# Patient Record
Sex: FEMALE | Race: WHITE | NOT HISPANIC OR LATINO | Employment: UNEMPLOYED | URBAN - METROPOLITAN AREA
[De-identification: names, ages, dates, MRNs, and addresses within clinical notes are randomized per-mention and may not be internally consistent; named-entity substitution may affect disease eponyms.]

---

## 2021-02-24 ENCOUNTER — OFFICE VISIT (OUTPATIENT)
Dept: OBSTETRICS AND GYNECOLOGY | Facility: CLINIC | Age: 61
End: 2021-02-24

## 2021-02-24 VITALS
DIASTOLIC BLOOD PRESSURE: 80 MMHG | SYSTOLIC BLOOD PRESSURE: 128 MMHG | WEIGHT: 171 LBS | BODY MASS INDEX: 27.48 KG/M2 | HEIGHT: 66 IN

## 2021-02-24 DIAGNOSIS — N94.10 FEMALE DYSPAREUNIA: Primary | ICD-10-CM

## 2021-02-24 PROCEDURE — 99213 OFFICE O/P EST LOW 20 MIN: CPT | Performed by: NURSE PRACTITIONER

## 2021-02-24 RX ORDER — HYDROXYCHLOROQUINE SULFATE 200 MG/1
TABLET, FILM COATED ORAL DAILY
COMMUNITY

## 2021-02-24 RX ORDER — MELOXICAM 7.5 MG/1
7.5 TABLET ORAL DAILY
COMMUNITY

## 2021-02-24 RX ORDER — ZOLPIDEM TARTRATE 10 MG/1
10 TABLET ORAL NIGHTLY PRN
COMMUNITY

## 2021-02-24 RX ORDER — ESTRADIOL 0.1 MG/G
CREAM VAGINAL
Qty: 1 EACH | Refills: 5 | Status: SHIPPED | OUTPATIENT
Start: 2021-02-24

## 2021-02-24 RX ORDER — LEVOTHYROXINE SODIUM 0.1 MG/1
TABLET ORAL
COMMUNITY
Start: 2020-11-22

## 2021-02-24 RX ORDER — VENLAFAXINE HYDROCHLORIDE 150 MG/1
150 CAPSULE, EXTENDED RELEASE ORAL DAILY
COMMUNITY

## 2021-02-24 NOTE — PROGRESS NOTES
"Chief Complaint   Patient presents with   • Follow-up     painful intercourse         Subjective   HPI  Violette Boss is a 60 y.o. female, No obstetric history on file., who presents with evaluation of vaginal dryness and  tenderness that is initial.  Patient states she is starting to have painful intercourse. Patient reports feeling a lot of tenderness and pressure.    She states she has experienced this problem for several month.  She describes the severity as moderate. The patient has previously been evaluated for vaginitis.  She had been on ERT in the past but was taken off of it due to past h/o stroke.     Her last LMP was No LMP recorded (lmp unknown). Patient has had a hysterectomy. Partner Status: Marital Status: .  New Partners since last visit: no.  Desires STD Screening: no.    Additional OB/GYN History   Last Pap :   Last Completed Pap Smear     Patient has no health maintenance due at this time        History of abnormal Pap smear: no  OB History    No obstetric history on file.         The additional following portions of the patient's history were reviewed and updated as appropriate: allergies, current medications, past family history, past medical history, past social history, past surgical history and problem list.    Review of Systems   Constitutional: Negative.    HENT: Negative.    Eyes: Negative.    Respiratory: Negative.    Cardiovascular: Negative.    Gastrointestinal: Negative.    Endocrine: Negative.    Genitourinary: Positive for dyspareunia and vaginal pain.   Musculoskeletal: Negative.    Skin: Negative.    Allergic/Immunologic: Negative.    Neurological: Negative.    Hematological: Negative.    Psychiatric/Behavioral: Negative.      All other systems reviewed and are negative.     I have reviewed and agree with the HPI, ROS, and historical information as entered above. Sarah Pina, APRN    Objective   /80   Ht 167.6 cm (66\")   Wt 77.6 kg (171 lb)   LMP  (LMP Unknown)  "  BMI 27.60 kg/m²     Physical Exam  Vitals signs and nursing note reviewed. Exam conducted with a chaperone present.   Constitutional:       Appearance: Normal appearance. She is normal weight.   Genitourinary:     General: Normal vulva.      Vagina: Normal.      Uterus: Absent.       Rectum: Normal.   Neurological:      Mental Status: She is alert.           Assessment/Plan     Assessment and Plan    Problem List Items Addressed This Visit     None      dyspareunia    1. Will use Estrace cream twice weekly for dyspareunia. (h/o stroke in 2008 and had been on ERT for several years following this but was taken off of ERT last year due to age and potential risks). Okay to use Estrace cream twice weekly for dyspareunia. Advised to do kegel exercises for prolapsed bladder and avoid heavy lifting. She denies urinary incontinence.         Sarah Pina, APRN  02/24/2021

## 2021-09-05 PROCEDURE — U0004 COV-19 TEST NON-CDC HGH THRU: HCPCS | Performed by: NURSE PRACTITIONER

## 2021-09-07 ENCOUNTER — TELEPHONE (OUTPATIENT)
Dept: URGENT CARE | Facility: CLINIC | Age: 61
End: 2021-09-07

## 2024-05-21 PROBLEM — M35.9 UNDIFFERENTIATED CONNECTIVE TISSUE DISEASE: Status: ACTIVE | Noted: 2024-05-21

## 2024-05-22 ENCOUNTER — OFFICE VISIT (OUTPATIENT)
Age: 64
End: 2024-05-22
Payer: COMMERCIAL

## 2024-05-22 ENCOUNTER — LAB (OUTPATIENT)
Facility: HOSPITAL | Age: 64
End: 2024-05-22
Payer: COMMERCIAL

## 2024-05-22 VITALS
TEMPERATURE: 96.9 F | WEIGHT: 158.8 LBS | HEART RATE: 88 BPM | SYSTOLIC BLOOD PRESSURE: 110 MMHG | HEIGHT: 68 IN | DIASTOLIC BLOOD PRESSURE: 78 MMHG | BODY MASS INDEX: 24.07 KG/M2

## 2024-05-22 DIAGNOSIS — M23.8X1 CREPITUS OF BOTH KNEE JOINTS: ICD-10-CM

## 2024-05-22 DIAGNOSIS — Z79.899 HIGH RISK MEDICATION USE: ICD-10-CM

## 2024-05-22 DIAGNOSIS — M23.8X2 CREPITUS OF BOTH KNEE JOINTS: ICD-10-CM

## 2024-05-22 DIAGNOSIS — M25.562 LEFT KNEE PAIN, UNSPECIFIED CHRONICITY: ICD-10-CM

## 2024-05-22 DIAGNOSIS — M35.9 UNDIFFERENTIATED CONNECTIVE TISSUE DISEASE: Primary | ICD-10-CM

## 2024-05-22 DIAGNOSIS — M35.9 UNDIFFERENTIATED CONNECTIVE TISSUE DISEASE: ICD-10-CM

## 2024-05-22 PROBLEM — R53.83 OTHER FATIGUE: Status: ACTIVE | Noted: 2024-05-22

## 2024-05-22 PROBLEM — R53.83 OTHER FATIGUE: Status: RESOLVED | Noted: 2024-05-22 | Resolved: 2024-05-22

## 2024-05-22 LAB
BASOPHILS # BLD AUTO: 0.04 10*3/MM3 (ref 0–0.2)
BASOPHILS NFR BLD AUTO: 0.7 % (ref 0–1.5)
DEPRECATED RDW RBC AUTO: 40.6 FL (ref 37–54)
EOSINOPHIL # BLD AUTO: 0.16 10*3/MM3 (ref 0–0.4)
EOSINOPHIL NFR BLD AUTO: 2.8 % (ref 0.3–6.2)
ERYTHROCYTE [DISTWIDTH] IN BLOOD BY AUTOMATED COUNT: 12 % (ref 12.3–15.4)
HCT VFR BLD AUTO: 42.7 % (ref 34–46.6)
HGB BLD-MCNC: 14 G/DL (ref 12–15.9)
IMM GRANULOCYTES # BLD AUTO: 0.01 10*3/MM3 (ref 0–0.05)
IMM GRANULOCYTES NFR BLD AUTO: 0.2 % (ref 0–0.5)
LYMPHOCYTES # BLD AUTO: 2.02 10*3/MM3 (ref 0.7–3.1)
LYMPHOCYTES NFR BLD AUTO: 35.8 % (ref 19.6–45.3)
MCH RBC QN AUTO: 30.5 PG (ref 26.6–33)
MCHC RBC AUTO-ENTMCNC: 32.8 G/DL (ref 31.5–35.7)
MCV RBC AUTO: 93 FL (ref 79–97)
MONOCYTES # BLD AUTO: 0.52 10*3/MM3 (ref 0.1–0.9)
MONOCYTES NFR BLD AUTO: 9.2 % (ref 5–12)
NEUTROPHILS NFR BLD AUTO: 2.9 10*3/MM3 (ref 1.7–7)
NEUTROPHILS NFR BLD AUTO: 51.3 % (ref 42.7–76)
NRBC BLD AUTO-RTO: 0 /100 WBC (ref 0–0.2)
PLATELET # BLD AUTO: 292 10*3/MM3 (ref 140–450)
PMV BLD AUTO: 10.2 FL (ref 6–12)
RBC # BLD AUTO: 4.59 10*6/MM3 (ref 3.77–5.28)
WBC NRBC COR # BLD AUTO: 5.65 10*3/MM3 (ref 3.4–10.8)

## 2024-05-22 PROCEDURE — 85652 RBC SED RATE AUTOMATED: CPT

## 2024-05-22 PROCEDURE — 36415 COLL VENOUS BLD VENIPUNCTURE: CPT

## 2024-05-22 PROCEDURE — 80053 COMPREHEN METABOLIC PANEL: CPT

## 2024-05-22 PROCEDURE — 86140 C-REACTIVE PROTEIN: CPT

## 2024-05-22 PROCEDURE — 85025 COMPLETE CBC W/AUTO DIFF WBC: CPT

## 2024-05-22 RX ORDER — MELOXICAM 7.5 MG/1
7.5 TABLET ORAL DAILY
Qty: 30 TABLET | Refills: 3 | Status: SHIPPED | OUTPATIENT
Start: 2024-05-22

## 2024-05-22 RX ORDER — HYDROXYCHLOROQUINE SULFATE 200 MG/1
200 TABLET, FILM COATED ORAL DAILY
Qty: 30 TABLET | Refills: 3 | Status: SHIPPED | OUTPATIENT
Start: 2024-05-22

## 2024-05-22 RX ORDER — ZOLPIDEM TARTRATE 10 MG/1
10 TABLET ORAL NIGHTLY PRN
Qty: 30 TABLET | Refills: 3 | Status: SHIPPED | OUTPATIENT
Start: 2024-05-22

## 2024-05-22 NOTE — PROGRESS NOTES
[facility] Follow Up Visit     Office Follow Up      Date: 05/22/2024   Patient Name: Violette Boss  MRN: [id number]  YOB: 1960    Referring Physician: Mickie Hayes MD     Chief Complaint:   Chief Complaint   Patient presents with    Arthritis       History of Present Illness: Violette Boss is a 63 y.o. female who is here today for follow up on Undifferentiated CTD.  Am stiffness 30 minutes.  Pt rates her pain 0/10 today.  She has some L knee pain that is intermittent. She has been to orthopedics and PT for this. Received Lt knee steroid injection , did not work. Went to PT. Denies receiving dry needling. Never had MRI on knee. Walking makes knee pain worse.  When she is more active a lot it helps it some. PT helped just a tiny bit. She went off Meloxicam a while to see if it worked, her knee pain worsened, she went back on it.   Continue Plaq once daily  Uses sunscreen.   Denies photosensitive rash, Oron[place]al ulcers, Pleurisy, , Synovitis   Positive Dry Mouth  Positive Raynaud's in feet. When Cold they turn red to blue.        Result Review :          Subjective   Review of Systems: Review of Systems   Constitutional:  Positive for fatigue. Negative for fever.        Night sweats   HENT:  Negative for mouth sores, nosebleeds, swollen glands and trouble swallowing.    Eyes:  Negative for blurred vision, double vision, photophobia, pain and visual disturbance.   Respiratory:  Negative for apnea, cough, choking and shortness of breath.    Cardiovascular:  Positive for palpitations. Negative for chest pain and leg swelling.        Raynaud's   Gastrointestinal:  Positive for GERD. Negative for abdominal pain, blood in stool, constipation, diarrhea, nausea and vomiting.   Endocrine: Negative for cold intolerance, heat intolerance, polydipsia, polyphagia and polyuria.        Hot flashes  Hair loss   Genitourinary:  Negative for difficulty urinating,  dysuria, genital sores, hematuria and urinary incontinence.   Musculoskeletal:  Negative for arthralgias, back pain, gait problem, joint swelling, myalgias, neck pain, neck stiffness and bursitis.        Joint pain  Joint tenderness  Morning stiffness   Skin:  Negative for rash.   Allergic/Immunologic: Negative for environmental allergies and food allergies.   Neurological:  Negative for dizziness, tremors, seizures, syncope, weakness, numbness, headache, memory problem and confusion.   Hematological:  Negative for adenopathy. Bruises/bleeds easily.   Psychiatric/Behavioral:  Negative for sleep disturbance, suicidal ideas, depressed mood and stress. The patient is not nervous/anxious.         Medications:   Current Outpatient Medications:     acetaminophen (TYLENOL) 500 MG tablet, Take 2 tablets by mouth Every 4 (Four) to 6 (Six) Hours As Needed. Do not exceed 8 tablets per 24 hrs as needed, Disp: , Rfl:     aspirin 81 MG EC tablet, Take 2 tablets by mouth Daily., Disp: , Rfl:     carbidopa-levodopa (SINEMET)  MG per tablet, 1.5 tablets Every Night., Disp: , Rfl:     hydroxychloroquine (PLAQUENIL) 200 MG tablet, Take  by mouth Daily., Disp: , Rfl:     levothyroxine (SYNTHROID, LEVOTHROID) 100 MCG tablet, , Disp: , Rfl:     meloxicam (MOBIC) 7.5 MG tablet, Take 1 tablet by mouth Daily., Disp: , Rfl:     omeprazole (priLOSEC) 20 MG capsule, Take 1 capsule by mouth Daily. 30 minutes to 1 hr before a meal every other day, Disp: , Rfl:     venlafaxine XR (EFFEXOR-XR) 150 MG 24 hr capsule, Take 1 capsule by mouth Daily., Disp: , Rfl:     zolpidem (AMBIEN) 10 MG tablet, Take 1 tablet by mouth At Night As Needed for Sleep., Disp: , Rfl:     estradiol (ESTRACE VAGINAL) 0.1 MG/GM vaginal cream, Insert 1 gram per vagina twice weekly (Patient not taking: Reported on 5/22/2024), Disp: 1 each, Rfl: 5    levothyroxine (SYNTHROID, LEVOTHROID) 25 MCG tablet, Take 1 tablet by mouth Daily. (Patient not taking: Reported on  "5/22/2024), Disp: , Rfl:     methylPREDNISolone (MEDROL) 4 MG dose pack, Take as directed on package instructions. (Patient not taking: Reported on 5/22/2024), Disp: 21 tablet, Rfl: 0    venlafaxine XR (EFFEXOR-XR) 75 MG 24 hr capsule, Take 1 capsule by mouth Daily. In the morning at the same time each day (Patient not taking: Reported on 5/22/2024), Disp: , Rfl:     Allergies:   Allergies   Allergen Reactions    Hydroxychloroquine Rash       I have reviewed and updated the patient's chief complaint, history of present illness, review of systems, past medical history, surgical history, family history, social history, medications and allergy list as appropriate.     Objective    Vital Signs:   Vitals:    05/22/24 1412   BP: 110/78   BP Location: Left arm   Patient Position: Sitting   Cuff Size: Adult   Pulse: 88   Temp: 96.9 °F (36.1 °C)   TempSrc: Temporal   Weight: 72 kg (158 lb 12.8 oz)   Height: 172.7 cm (68\")   PainSc: 0-No pain     Violetteeddie Boss reports a pain score of 0.  Given her pain assessment as noted, treatment options were discussed and the following options were decided upon as a follow-up plan to address the patient's pain: continuation of current treatment plan for pain.      Body mass index is 24.15 kg/m².  BMI cannot be calculated due to outdated height or weight values.  Please input a current height/weight in Vitals and re-renter BMIFOLLOWUP in Note to pull in correct documentation based on BMI range.      Physical Exam:  Physical Exam  Constitutional:       General: She is not in acute distress.     Appearance: She is not ill-appearing, toxic-appearing or diaphoretic.   HENT:      Right Ear: External ear normal.      Left Ear: External ear normal.      Nose: Nose normal. No congestion.      Mouth/Throat:      Pharynx: Oropharynx is clear. No oropharyngeal exudate or posterior oropharyngeal erythema.   Eyes:      Extraocular Movements: Extraocular movements intact.      Conjunctiva/sclera: " Conjunctivae normal.      Pupils: Pupils are equal, round, and reactive to light.   Cardiovascular:      Rate and Rhythm: Normal rate and regular rhythm.      Pulses: Normal pulses.      Heart sounds: Normal heart sounds.   Pulmonary:      Effort: Pulmonary effort is normal.      Breath sounds: Normal breath sounds.   Abdominal:      General: Abdomen is flat. Bowel sounds are normal.      Palpations: Abdomen is soft.   Musculoskeletal:         General: Normal range of motion.      Right knee: Crepitus present.      Left knee: Crepitus (L>R) present.   Skin:     General: Skin is warm and dry.      Capillary Refill: Capillary refill takes less than 2 seconds.   Neurological:      General: No focal deficit present.      Mental Status: She is oriented to person, place, and time.      Cranial Nerves: No cranial nerve deficit.      Motor: No weakness.      Deep Tendon Reflexes: Reflexes normal.   Psychiatric:         Mood and Affect: Mood normal.         Behavior: Behavior normal.         Thought Content: Thought content normal.          Physical Exam  There is currently no information documented on the homunculus. Go to the Rheumatology activity and complete the homunculus joint exam.     Results Review:   Imaging Results (Last 24 Hours)       ** No results found for the last 24 hours. **            Procedures    Assessment / Plan    Assessment/Plan:   Diagnoses and all orders for this visit:    1. Undifferentiated connective tissue disease (Primary)  Assessment & Plan:  + RACHELLE, photosensitive rash, Raynaud's.  Mostly has fatigue and intermittent joint pains.    Previous Rx: Plaquenil (caused a rash), sulfasalazine (upset stomach)  Current Rx: Plaquenil 200 mg daily    Plan:  Continue daily exercise.  Continue Plaquenil 200mg daily.   SPF 50+  Biotene or ACT toothpaste or mouth rinse  Xylimelts at bedtime - [facility], [facility]  [place] spray from [facility] for mouth dryness (amazon)  Plenty of fluids  If no Asthma or COPD there  are two prescriptions avaibile.       2. Left knee pain, unspecified chronicity  Assessment & Plan:  She has some L knee pain that is intermittent. She has been to orthopedics and PT for this. Received Lt knee steroid injection , did not work. Went to PT. Denies receiving dry needling. Never had MRI on knee. Walking makes knee pain worse.  When she is more active a lot it helps it some. PT helped just a tiny bit. She went off Meloxicam a while to see if it worked, her knee pain worsened, she went back on it.   Does want to give way periodically. 8/10 pain when walking at times.   Plan:  Continue meloxicam 7.5mg daily.    Tylenol Arthritis 2 tablets twice dailys as needed - [facility] generic or BRAND  Continue baby aspirin.   XR L knee today.  ? 2nd opinion Ortho.  Continue Plaq once daily         3. Crepitus of both knee joints  Assessment & Plan:  Bilateral knee films today      4. High risk medication use  Assessment & Plan:  Plaquenil well tolerated and effective.     Plan:    CBC, CMP q 6 mos.  Annual OCT eye exam. 11/23                Follow Up:   Return in about 6 months (around 11/22/2024) for Next scheduled follow up.        Scribed for Lyndsey Conklin MD by Magdalena Helms MA 5/22/2024  15:19 EDT      Lyndsey Conklin MD  [facility]

## 2024-05-22 NOTE — ASSESSMENT & PLAN NOTE
+ RACHELLE, photosensitive rash, Raynaud's.  Mostly has fatigue and intermittent joint pains.    Previous Rx: Plaquenil (caused a rash), sulfasalazine (upset stomach)  Current Rx: Plaquenil 200 mg daily    Plan:  Continue daily exercise.  Continue Plaquenil 200mg daily.   SPF 50+  Biotene or ACT toothpaste or mouth rinse  Xylimelts at bedtime - [facility], [facility]  [place] spray from [facility] for mouth dryness (amazon)  Plenty of fluids  If no Asthma or COPD there are two prescriptions avaibile.

## 2024-05-22 NOTE — ASSESSMENT & PLAN NOTE
She has some L knee pain that is intermittent. She has been to orthopedics and PT for this. Received Lt knee steroid injection , did not work. Went to PT. Denies receiving dry needling. Never had MRI on knee. Walking makes knee pain worse.  When she is more active a lot it helps it some. PT helped just a tiny bit. She went off Meloxicam a while to see if it worked, her knee pain worsened, she went back on it.   Does want to give way periodically. 8/10 pain when walking at times.   Plan:  Continue meloxicam 7.5mg daily.    Tylenol Arthritis 2 tablets twice dailys as needed - [facility] generic or BRAND  Continue baby aspirin.   XR L knee today.  ? 2nd opinion Ortho.  Continue Plaq once daily

## 2024-05-22 NOTE — ASSESSMENT & PLAN NOTE
Plaquenil well tolerated and effective.     Plan:    CBC, CMP q 6 mos.  Annual OCT eye exam. 11/23

## 2024-05-23 LAB
ALBUMIN SERPL-MCNC: 4.5 G/DL (ref 3.5–5.2)
ALBUMIN/GLOB SERPL: 1.7 G/DL
ALP SERPL-CCNC: 77 U/L (ref 39–117)
ALT SERPL W P-5'-P-CCNC: 31 U/L (ref 1–33)
ANION GAP SERPL CALCULATED.3IONS-SCNC: 7.4 MMOL/L (ref 5–15)
AST SERPL-CCNC: 22 U/L (ref 1–32)
BILIRUB SERPL-MCNC: 0.4 MG/DL (ref 0–1.2)
BUN SERPL-MCNC: 15 MG/DL (ref 8–23)
BUN/CREAT SERPL: 16.1 (ref 7–25)
CALCIUM SPEC-SCNC: 9.8 MG/DL (ref 8.6–10.5)
CHLORIDE SERPL-SCNC: 100 MMOL/L (ref 98–107)
CO2 SERPL-SCNC: 30.6 MMOL/L (ref 22–29)
CREAT SERPL-MCNC: 0.93 MG/DL (ref 0.57–1)
CRP SERPL-MCNC: <0.3 MG/DL (ref 0–0.5)
EGFRCR SERPLBLD CKD-EPI 2021: 70.1 ML/MIN/1.73
ERYTHROCYTE [SEDIMENTATION RATE] IN BLOOD: 8 MM/HR (ref 0–30)
GLOBULIN UR ELPH-MCNC: 2.6 GM/DL
GLUCOSE SERPL-MCNC: 88 MG/DL (ref 65–99)
POTASSIUM SERPL-SCNC: 4.5 MMOL/L (ref 3.5–5.2)
PROT SERPL-MCNC: 7.1 G/DL (ref 6–8.5)
SODIUM SERPL-SCNC: 138 MMOL/L (ref 136–145)

## 2024-07-05 ENCOUNTER — TELEPHONE (OUTPATIENT)
Age: 64
End: 2024-07-05

## 2024-07-07 NOTE — TELEPHONE ENCOUNTER
To my knowledge there should not be a problem with those 2 meds however if she is not sure about taking them together she can ask her pharmacist as well.

## 2024-11-21 ENCOUNTER — TELEPHONE (OUTPATIENT)
Age: 64
End: 2024-11-21
Payer: COMMERCIAL

## 2024-11-21 NOTE — TELEPHONE ENCOUNTER
Patient had an appointment scheduled with Dr. Lyndsey Conklin that was on 11/25/24 and then she said another got scheduled for 12/06/24. She wanted to know which appointment she was supposed to keep.     I let her know you would give her a call back to discuss her options for appointments as Dr. Conklin is not currently in office.

## 2024-11-21 NOTE — TELEPHONE ENCOUNTER
PT HAD TO BE CANCELLED DUE TO A PROVIDER EMERGENCY. I HAVE REACHED OUT VIA ARTERA, CUPRHART AND PHONE. IF PT CALLS BACK TO RESCHEDULE, PLEASE SCHEDULE WITH FATUMA JOHNSON OR LATOYA. IF THE PT WANTS TO SEE DR. CASTANEDA, SHE IS BOOKED UNTIL MARCH. IF IT IS A NEW PT, CONFRIM IF THEY ARE INTRESTED IN SEEING ANOTHER PROVIDER OR IF THEY WANT TO WAIT FOR DR. CASTANEDA. PT HAS BEEN ADDED TO THE CANCELLATION LIST AS HIGH PRIORITY.         -HUB READY TO SCHEDULE.

## 2024-11-24 DIAGNOSIS — M35.9 UNDIFFERENTIATED CONNECTIVE TISSUE DISEASE: ICD-10-CM

## 2024-11-26 RX ORDER — ZOLPIDEM TARTRATE 10 MG/1
10 TABLET ORAL NIGHTLY PRN
Qty: 30 TABLET | Refills: 5 | Status: SHIPPED | OUTPATIENT
Start: 2024-11-26

## 2024-12-12 DIAGNOSIS — M35.9 UNDIFFERENTIATED CONNECTIVE TISSUE DISEASE: ICD-10-CM

## 2024-12-12 RX ORDER — CARBIDOPA AND LEVODOPA 25; 100 MG/1; MG/1
TABLET ORAL
Qty: 60 TABLET | Refills: 3 | Status: SHIPPED | OUTPATIENT
Start: 2024-12-12

## 2025-02-28 ENCOUNTER — TELEPHONE (OUTPATIENT)
Age: 65
End: 2025-02-28
Payer: COMMERCIAL

## 2025-02-28 NOTE — TELEPHONE ENCOUNTER
PT APPT HAD TO BE CANCELLED. IF PT CALLS BACK TO RESCHEDULE, PLEASE SCHEDULE WITH FTAUMA JOHNSON OR LATOYA. IF NOT ALREADY ON, PLEASE ADD PT TO THE CANCELLATION LIST AS NORMAL PRIOTIRY WITH AN END DATE OF 12/31/2025.   -HUB READY TO SCHEDULE.

## 2025-06-23 ENCOUNTER — TELEPHONE (OUTPATIENT)
Age: 65
End: 2025-06-23
Payer: COMMERCIAL

## 2025-06-23 NOTE — TELEPHONE ENCOUNTER
Incoming Refill Request      Medication requested (name and dose): hydroxychloroquine (PLAQUENIL) 200 MG tablet     Pharmacy where request should be sent: [facility] [place]    Additional details provided by patient: PT'S RX IS OUT OF DATE    Best call back number: [phone]    Does the patient have less than a 3 day supply:  [x] Yes  [] No    Angela Dockery Rep  06/23/25, 10:36 EDT

## 2025-07-11 NOTE — ASSESSMENT & PLAN NOTE
5/2024 knee films with mild to moderate OA bilaterally  Left TKR with Dr. TRIPLETT 7/2025    Continue meloxicam 7.5mg daily.    Tylenol Arthritis 2 tablets  She is in PT s/p knee replacement

## 2025-07-11 NOTE — PROGRESS NOTES
[facility] [place] [facility] Follow Up Visit     Office Follow Up      Date: 07/22/2025   Patient Name: Violette Boss  MRN: [id number]  YOB: 1960    Referring Physician: No ref. provider found     Chief Complaint:   Chief Complaint   Patient presents with    Follow-up    UCTD       History of Present Illness: Violette Boss is a 65 y.o. female who is here today for follow up on Undifferentiated CTD.    She was last seen in clinic with Dr. Conklin 5/22/24.    Am stiffness 30 minutes.  She rates her pain 2/10 today.  Continues on Plaquenil once daily and meloxicam 7.5 mg daily.  She had left knee TKR with Dr. TRIPLETT about 3 weeks ago. She is in PT now.   No recent illness/infections.    Denies photosensitive rash, oronasal ulcers, Pleurisy, synovitis   Positive dry mouth. Positive Raynaud's symptoms in feet.      Subjective   Review of Systems: Review of Systems   All other systems reviewed and are negative.       Medications:   Current Outpatient Medications:     acetaminophen (TYLENOL) 500 MG tablet, Take 2 tablets by mouth Every 4 (Four) to 6 (Six) Hours As Needed. Do not exceed 8 tablets per 24 hrs as needed, Disp: , Rfl:     aspirin 81 MG EC tablet, Take 2 tablets by mouth Daily., Disp: , Rfl:     carbidopa-levodopa (SINEMET)  MG per tablet, TAKE 1 AND 1/2 TABLET BY MOUTH AT BEDTIME, Disp: 60 tablet, Rfl: 3    hydroxychloroquine (PLAQUENIL) 200 MG tablet, Take 1 tablet by mouth Daily., Disp: 30 tablet, Rfl: 3    levothyroxine (SYNTHROID, LEVOTHROID) 100 MCG tablet, , Disp: , Rfl:     meloxicam (MOBIC) 7.5 MG tablet, Take 1 tablet by mouth Daily., Disp: 30 tablet, Rfl: 3    Omeprazole 20 MG tablet delayed-release, Take 20 mg by mouth Daily., Disp: , Rfl:     venlafaxine XR (EFFEXOR-XR) 150 MG 24 hr capsule, Take 1 capsule by mouth Daily., Disp: , Rfl:     zolpidem (AMBIEN) 10 MG tablet, Take 1 tablet by mouth At Night As Needed for Sleep (sleep). for sleep, Disp: 30  "tablet, Rfl: 5    Magnesium 100 MG capsule, Take 100 mg by mouth Daily., Disp: , Rfl:     Allergies:   No Known Allergies      I have reviewed and updated the patient's chief complaint, history of present illness, review of systems, past medical history, surgical history, family history, social history, medications and allergy list as appropriate.     Objective    Vital Signs:   Vitals:    07/22/25 1454   BP: 132/70   Pulse: 90   Temp: 97.2 °F (36.2 °C)   Weight: 75 kg (165 lb 6.4 oz)   Height: 172.7 cm (68\")   PainSc: 3        Body mass index is 25.15 kg/m².  Defer to PCP        Physical Exam:  Physical Exam  Constitutional:       Appearance: Normal appearance.   HENT:      Head: Normocephalic and atraumatic.   Eyes:      Conjunctiva/sclera: Conjunctivae normal.      Pupils: Pupils are equal, round, and reactive to light.   Cardiovascular:      Rate and Rhythm: Normal rate.   Pulmonary:      Effort: Pulmonary effort is normal.   Musculoskeletal:         General: Normal range of motion.      Cervical back: Normal range of motion.      Comments: Left knee s/p TKR with well healed approximated incision. No signs infection.  Right knee crepitus  OA changes hands   Skin:     General: Skin is warm and dry.   Neurological:      General: No focal deficit present.      Mental Status: She is alert and oriented to person, place, and time.   Psychiatric:         Mood and Affect: Mood normal.         Behavior: Behavior normal.         Thought Content: Thought content normal.         Judgment: Judgment normal.         Assessment / Plan      Assessment & Plan  Undifferentiated connective tissue disease  + RACHELLE, photosensitive rash, Raynaud's.  Mostly has fatigue and intermittent joint pains.  Previous Rx: Plaquenil (caused a rash), sulfasalazine (upset stomach)  Current Rx: Plaquenil 200 mg daily    Continue Plaquenil 200mg daily.   SPF 50+  Biotene or ACT toothpaste or mouth rinse  Xylimelts at bedtime - [facility], [facility]  [place] " spray from [employer] for mouth dryness (amazon)  Plenty of fluids  Check labs today  RTC 6 months  High risk medication use  Plaquenil well tolerated and effective.     CBC, CMP q 6 months- 5/2024 stable- ordered today  Annual OCT eye exam- UTD  Left knee pain, unspecified chronicity  5/2024 knee films with mild to moderate OA bilaterally  Left TKR with Dr. TRIPLETT 7/2025    Continue meloxicam 7.5mg daily.    Tylenol Arthritis 2 tablets  She is in PT s/p knee replacement      Discussed plan of care in detail with the patient today.  Patient verbalized understanding and agrees.    I confirm accuracy of unchanged data/findings which have been carried forward from previous visit.  I have updated appropriately those that have changed.      Follow Up:   Return in about 6 months (around 1/22/2026) for Dr. Marie.    ARNEL Casanova  [facility] [place] [facility]

## 2025-07-11 NOTE — ASSESSMENT & PLAN NOTE
Plaquenil well tolerated and effective.     CBC, CMP q 6 months- 5/2024 stable- ordered today  Annual OCT eye exam- UTD

## 2025-07-11 NOTE — ASSESSMENT & PLAN NOTE
+ RACHELLE, photosensitive rash, Raynaud's.  Mostly has fatigue and intermittent joint pains.  Previous Rx: Plaquenil (caused a rash), sulfasalazine (upset stomach)  Current Rx: Plaquenil 200 mg daily    Continue Plaquenil 200mg daily.   SPF 50+  Biotene or ACT toothpaste or mouth rinse  Xylimelts at bedtime - [facility], [facility]  [place] spray from [employer] for mouth dryness (amazon)  Plenty of fluids  Check labs today  RTC 6 months

## 2025-07-22 ENCOUNTER — OFFICE VISIT (OUTPATIENT)
Age: 65
End: 2025-07-22
Payer: COMMERCIAL

## 2025-07-22 VITALS
WEIGHT: 165.4 LBS | HEART RATE: 90 BPM | TEMPERATURE: 97.2 F | DIASTOLIC BLOOD PRESSURE: 70 MMHG | HEIGHT: 68 IN | BODY MASS INDEX: 25.07 KG/M2 | SYSTOLIC BLOOD PRESSURE: 132 MMHG

## 2025-07-22 DIAGNOSIS — M25.562 LEFT KNEE PAIN, UNSPECIFIED CHRONICITY: ICD-10-CM

## 2025-07-22 DIAGNOSIS — M23.8X1 CREPITUS OF BOTH KNEE JOINTS: ICD-10-CM

## 2025-07-22 DIAGNOSIS — M35.9 UNDIFFERENTIATED CONNECTIVE TISSUE DISEASE: Primary | ICD-10-CM

## 2025-07-22 DIAGNOSIS — Z79.899 HIGH RISK MEDICATION USE: ICD-10-CM

## 2025-07-22 DIAGNOSIS — M23.8X2 CREPITUS OF BOTH KNEE JOINTS: ICD-10-CM

## 2025-07-22 PROCEDURE — 99214 OFFICE O/P EST MOD 30 MIN: CPT | Performed by: NURSE PRACTITIONER

## 2025-07-22 RX ORDER — HYDROXYCHLOROQUINE SULFATE 200 MG/1
200 TABLET, FILM COATED ORAL DAILY
Qty: 30 TABLET | Refills: 3 | Status: SHIPPED | OUTPATIENT
Start: 2025-07-22

## 2025-07-22 RX ORDER — ZOLPIDEM TARTRATE 10 MG/1
10 TABLET ORAL NIGHTLY PRN
Qty: 30 TABLET | Refills: 5 | Status: SHIPPED | OUTPATIENT
Start: 2025-07-22

## 2025-07-22 RX ORDER — MELOXICAM 7.5 MG/1
7.5 TABLET ORAL DAILY
Qty: 30 TABLET | Refills: 3 | Status: SHIPPED | OUTPATIENT
Start: 2025-07-22

## 2025-07-23 LAB
ALBUMIN SERPL-MCNC: 4.4 G/DL (ref 3.9–4.9)
ALP SERPL-CCNC: 103 IU/L (ref 44–121)
ALT SERPL-CCNC: 26 IU/L (ref 0–32)
AST SERPL-CCNC: 27 IU/L (ref 0–40)
BILIRUB SERPL-MCNC: 0.5 MG/DL (ref 0–1.2)
BUN SERPL-MCNC: 26 MG/DL (ref 8–27)
BUN/CREAT SERPL: 22 (ref 12–28)
C3 SERPL-MCNC: 159 MG/DL (ref 82–167)
C4 SERPL-MCNC: 31 MG/DL (ref 12–38)
CALCIUM SERPL-MCNC: 10 MG/DL (ref 8.7–10.3)
CHLORIDE SERPL-SCNC: 95 MMOL/L (ref 96–106)
CO2 SERPL-SCNC: 23 MMOL/L (ref 20–29)
CREAT SERPL-MCNC: 1.18 MG/DL (ref 0.57–1)
CRP SERPL-MCNC: 1 MG/L (ref 0–10)
DSDNA AB SER QL CLIF: NEGATIVE
EGFRCR SERPLBLD CKD-EPI 2021: 52 ML/MIN/1.73
ERYTHROCYTE [DISTWIDTH] IN BLOOD BY AUTOMATED COUNT: 12.9 % (ref 11.7–15.4)
ERYTHROCYTE [SEDIMENTATION RATE] IN BLOOD BY WESTERGREN METHOD: 4 MM/HR (ref 0–40)
GLOBULIN SER CALC-MCNC: 2.6 G/DL (ref 1.5–4.5)
GLUCOSE SERPL-MCNC: 85 MG/DL (ref 70–99)
HCT VFR BLD AUTO: 43.2 % (ref 34–46.6)
HGB BLD-MCNC: 13.4 G/DL (ref 11.1–15.9)
MCH RBC QN AUTO: 29.9 PG (ref 26.6–33)
MCHC RBC AUTO-ENTMCNC: 31 G/DL (ref 31.5–35.7)
MCV RBC AUTO: 96 FL (ref 79–97)
PLATELET # BLD AUTO: 322 X10E3/UL (ref 150–450)
POTASSIUM SERPL-SCNC: 4.9 MMOL/L (ref 3.5–5.2)
PROT SERPL-MCNC: 7 G/DL (ref 6–8.5)
RBC # BLD AUTO: 4.48 X10E6/UL (ref 3.77–5.28)
SODIUM SERPL-SCNC: 135 MMOL/L (ref 134–144)
WBC # BLD AUTO: 6.8 X10E3/UL (ref 3.4–10.8)